# Patient Record
Sex: MALE | Race: WHITE | Employment: FULL TIME | URBAN - METROPOLITAN AREA
[De-identification: names, ages, dates, MRNs, and addresses within clinical notes are randomized per-mention and may not be internally consistent; named-entity substitution may affect disease eponyms.]

---

## 2017-09-26 ENCOUNTER — OFFICE VISIT (OUTPATIENT)
Dept: FAMILY MEDICINE CLINIC | Age: 27
End: 2017-09-26
Payer: COMMERCIAL

## 2017-09-26 VITALS
TEMPERATURE: 98.9 F | HEART RATE: 81 BPM | SYSTOLIC BLOOD PRESSURE: 124 MMHG | BODY MASS INDEX: 30.58 KG/M2 | DIASTOLIC BLOOD PRESSURE: 84 MMHG | HEIGHT: 72 IN | OXYGEN SATURATION: 97 % | WEIGHT: 225.8 LBS

## 2017-09-26 DIAGNOSIS — D17.79 LIPOMA OF OTHER SPECIFIED SITES: ICD-10-CM

## 2017-09-26 DIAGNOSIS — Z76.89 ENCOUNTER TO ESTABLISH CARE: Primary | ICD-10-CM

## 2017-09-26 DIAGNOSIS — Z11.3 ROUTINE SCREENING FOR STI (SEXUALLY TRANSMITTED INFECTION): ICD-10-CM

## 2017-09-26 DIAGNOSIS — Z11.4 SCREENING FOR HIV (HUMAN IMMUNODEFICIENCY VIRUS): ICD-10-CM

## 2017-09-26 PROCEDURE — 99203 OFFICE O/P NEW LOW 30 MIN: CPT | Performed by: NURSE PRACTITIONER

## 2017-09-26 ASSESSMENT — PATIENT HEALTH QUESTIONNAIRE - PHQ9
2. FEELING DOWN, DEPRESSED OR HOPELESS: 1
SUM OF ALL RESPONSES TO PHQ9 QUESTIONS 1 & 2: 1
1. LITTLE INTEREST OR PLEASURE IN DOING THINGS: 0
SUM OF ALL RESPONSES TO PHQ QUESTIONS 1-9: 1

## 2017-09-26 ASSESSMENT — ENCOUNTER SYMPTOMS
DIARRHEA: 0
SHORTNESS OF BREATH: 0
CONSTIPATION: 0
NAUSEA: 0
CHEST TIGHTNESS: 0
VOMITING: 0
TROUBLE SWALLOWING: 0

## 2019-08-08 ENCOUNTER — OFFICE VISIT (OUTPATIENT)
Dept: FAMILY MEDICINE CLINIC | Age: 29
End: 2019-08-08
Payer: COMMERCIAL

## 2019-08-08 VITALS
HEART RATE: 78 BPM | OXYGEN SATURATION: 97 % | DIASTOLIC BLOOD PRESSURE: 82 MMHG | HEIGHT: 72 IN | BODY MASS INDEX: 30.07 KG/M2 | SYSTOLIC BLOOD PRESSURE: 128 MMHG | RESPIRATION RATE: 16 BRPM | WEIGHT: 222 LBS | TEMPERATURE: 97.2 F

## 2019-08-08 DIAGNOSIS — D17.1 LIPOMA OF BACK: ICD-10-CM

## 2019-08-08 DIAGNOSIS — Z23 NEED FOR PROPHYLACTIC VACCINATION AGAINST DIPHTHERIA-TETANUS-PERTUSSIS (DTP): ICD-10-CM

## 2019-08-08 DIAGNOSIS — Z00.00 WELL ADULT EXAM: Primary | ICD-10-CM

## 2019-08-08 DIAGNOSIS — Z30.2 ENCOUNTER FOR VASECTOMY: ICD-10-CM

## 2019-08-08 PROCEDURE — 90471 IMMUNIZATION ADMIN: CPT | Performed by: INTERNAL MEDICINE

## 2019-08-08 PROCEDURE — 99385 PREV VISIT NEW AGE 18-39: CPT | Performed by: INTERNAL MEDICINE

## 2019-08-08 PROCEDURE — 90715 TDAP VACCINE 7 YRS/> IM: CPT | Performed by: INTERNAL MEDICINE

## 2019-08-08 ASSESSMENT — PATIENT HEALTH QUESTIONNAIRE - PHQ9
SUM OF ALL RESPONSES TO PHQ9 QUESTIONS 1 & 2: 0
SUM OF ALL RESPONSES TO PHQ QUESTIONS 1-9: 0
2. FEELING DOWN, DEPRESSED OR HOPELESS: 0
SUM OF ALL RESPONSES TO PHQ QUESTIONS 1-9: 0
1. LITTLE INTEREST OR PLEASURE IN DOING THINGS: 0

## 2019-08-08 NOTE — PATIENT INSTRUCTIONS
Patient Education        Learning About Benign Soft Tissue Tumors  What is a benign soft tissue tumor? A soft tissue tumor is a growth of abnormal cells in the body's soft tissues. These tissues include the muscles, lymph and blood vessels, nerves, and fat. They can also include cartilage and other connective tissues. When a tumor is benign (say \"Temitope\"), that means it's not cancer. Most soft tissue tumors are benign. Benign tumors don't spread to other tissues and organs. They usually aren't life-threatening. But they can cause problems if they grow too much, press on nerves, or cause pain. What are some common types of these tumors? Some common types of benign soft tissue tumors include:  Lipomas. These tumors form from fat cells. Angiolipomas are a type of lipoma made up of fat and blood vessels. Nerve sheath tumors. Tumors on a nerve may include schwannomas and neurofibromas. They might need to be removed. Benign synovial tumors. These appear around the tendons, near the knee, hip, elbow, or shoulder. Examples include giant cell tumor of the tendon sheath and synovial chondromatosis. Hemangiomas. These are tumors of the blood vessels. Desmoid tumors. These tumors commonly appear on the shoulder, chest, back, and thighs. Nodular fasciitis. These are most common in the arms. They can grow quickly. Other types of tumors may appear on the skin, belly, arms and legs, organs, and nerves. What are the symptoms? Sometimes a tumor can be felt as a bump under the skin. Or if the tumor is deep enough below the skin, you may not be able to feel it. You may also feel pain near the tumor if it's large or pressing on something. How are these tumors diagnosed? Your doctor will ask you about your symptoms and past health and will examine you. A physical exam can help your doctor diagnose some soft tissue tumors.   Your doctor may find a tumor when taking X-rays or other imaging tests for another

## 2019-08-10 ASSESSMENT — ENCOUNTER SYMPTOMS
ABDOMINAL PAIN: 0
SHORTNESS OF BREATH: 0
BACK PAIN: 0
COUGH: 0
STRIDOR: 0
NAUSEA: 0
RECTAL PAIN: 0
CONSTIPATION: 0
VOMITING: 0
DIARRHEA: 0
TROUBLE SWALLOWING: 0
WHEEZING: 0
CHEST TIGHTNESS: 0
COLOR CHANGE: 0
VOICE CHANGE: 0
BLOOD IN STOOL: 0

## 2019-10-16 PROBLEM — Z98.52 S/P VASECTOMY: Status: ACTIVE | Noted: 2019-10-16

## 2020-01-22 ENCOUNTER — TELEPHONE (OUTPATIENT)
Dept: FAMILY MEDICINE CLINIC | Age: 30
End: 2020-01-22

## 2020-02-11 ENCOUNTER — HOSPITAL ENCOUNTER (OUTPATIENT)
Age: 30
Setting detail: SPECIMEN
Discharge: HOME | End: 2020-02-11
Payer: COMMERCIAL

## 2020-02-11 LAB
ABSOLUTE EOS #: 0.36 K/UL (ref 0–0.44)
ABSOLUTE IMMATURE GRANULOCYTE: <0.03 K/UL (ref 0–0.3)
ABSOLUTE LYMPH #: 1.86 K/UL (ref 1.1–3.7)
ABSOLUTE MONO #: 0.55 K/UL (ref 0.1–1.2)
ALBUMIN SERPL-MCNC: 4.5 G/DL (ref 3.5–5.2)
ALBUMIN/GLOBULIN RATIO: 1.5 (ref 1–2.5)
ALP BLD-CCNC: 51 U/L (ref 40–129)
ALT SERPL-CCNC: 19 U/L (ref 5–41)
ANION GAP SERPL CALCULATED.3IONS-SCNC: 15 MMOL/L (ref 9–17)
AST SERPL-CCNC: 21 U/L
BASOPHILS # BLD: 1 % (ref 0–2)
BASOPHILS ABSOLUTE: 0.04 K/UL (ref 0–0.2)
BILIRUB SERPL-MCNC: 0.47 MG/DL (ref 0.3–1.2)
BUN BLDV-MCNC: 12 MG/DL (ref 6–20)
BUN/CREAT BLD: NORMAL (ref 9–20)
CALCIUM SERPL-MCNC: 9 MG/DL (ref 8.6–10.4)
CHLORIDE BLD-SCNC: 104 MMOL/L (ref 98–107)
CHOLESTEROL/HDL RATIO: 5
CHOLESTEROL: 225 MG/DL
CO2: 23 MMOL/L (ref 20–31)
CREAT SERPL-MCNC: 0.87 MG/DL (ref 0.7–1.2)
DIFFERENTIAL TYPE: ABNORMAL
EOSINOPHILS RELATIVE PERCENT: 8 % (ref 1–4)
GFR AFRICAN AMERICAN: >60 ML/MIN
GFR NON-AFRICAN AMERICAN: >60 ML/MIN
GFR SERPL CREATININE-BSD FRML MDRD: NORMAL ML/MIN/{1.73_M2}
GFR SERPL CREATININE-BSD FRML MDRD: NORMAL ML/MIN/{1.73_M2}
GLUCOSE BLD-MCNC: 84 MG/DL (ref 70–99)
HCT VFR BLD CALC: 47.7 % (ref 40.7–50.3)
HDLC SERPL-MCNC: 45 MG/DL
HEMOGLOBIN: 15.2 G/DL (ref 13–17)
IMMATURE GRANULOCYTES: 0 %
LDL CHOLESTEROL: 162 MG/DL (ref 0–130)
LYMPHOCYTES # BLD: 40 % (ref 24–43)
MCH RBC QN AUTO: 29.7 PG (ref 25.2–33.5)
MCHC RBC AUTO-ENTMCNC: 31.9 G/DL (ref 28.4–34.8)
MCV RBC AUTO: 93.2 FL (ref 82.6–102.9)
MONOCYTES # BLD: 12 % (ref 3–12)
NRBC AUTOMATED: 0 PER 100 WBC
PDW BLD-RTO: 13.7 % (ref 11.8–14.4)
PLATELET # BLD: 254 K/UL (ref 138–453)
PLATELET ESTIMATE: ABNORMAL
PMV BLD AUTO: 11 FL (ref 8.1–13.5)
POTASSIUM SERPL-SCNC: 4.9 MMOL/L (ref 3.7–5.3)
RBC # BLD: 5.12 M/UL (ref 4.21–5.77)
RBC # BLD: ABNORMAL 10*6/UL
SEG NEUTROPHILS: 39 % (ref 36–65)
SEGMENTED NEUTROPHILS ABSOLUTE COUNT: 1.78 K/UL (ref 1.5–8.1)
SODIUM BLD-SCNC: 142 MMOL/L (ref 135–144)
TOTAL PROTEIN: 7.5 G/DL (ref 6.4–8.3)
TRIGL SERPL-MCNC: 91 MG/DL
VLDLC SERPL CALC-MCNC: ABNORMAL MG/DL (ref 1–30)
WBC # BLD: 4.6 K/UL (ref 3.5–11.3)
WBC # BLD: ABNORMAL 10*3/UL

## 2020-02-17 ENCOUNTER — OFFICE VISIT (OUTPATIENT)
Dept: FAMILY MEDICINE CLINIC | Age: 30
End: 2020-02-17
Payer: COMMERCIAL

## 2020-02-17 VITALS
DIASTOLIC BLOOD PRESSURE: 72 MMHG | HEART RATE: 64 BPM | RESPIRATION RATE: 16 BRPM | TEMPERATURE: 98.2 F | WEIGHT: 226 LBS | HEIGHT: 72 IN | SYSTOLIC BLOOD PRESSURE: 130 MMHG | BODY MASS INDEX: 30.61 KG/M2

## 2020-02-17 PROCEDURE — 99214 OFFICE O/P EST MOD 30 MIN: CPT | Performed by: INTERNAL MEDICINE

## 2020-02-17 RX ORDER — CLINDAMYCIN HYDROCHLORIDE 300 MG/1
300 CAPSULE ORAL 3 TIMES DAILY
Qty: 21 CAPSULE | Refills: 0 | Status: SHIPPED | OUTPATIENT
Start: 2020-02-17 | End: 2020-02-24

## 2020-02-17 ASSESSMENT — ENCOUNTER SYMPTOMS
ABDOMINAL PAIN: 0
DIARRHEA: 0
COUGH: 0
SHORTNESS OF BREATH: 0
BACK PAIN: 0
VOICE CHANGE: 0
RHINORRHEA: 0
CHEST TIGHTNESS: 0
CHOKING: 0
STRIDOR: 0
WHEEZING: 0
TROUBLE SWALLOWING: 0
SINUS PRESSURE: 1
CONSTIPATION: 0
SINUS PAIN: 0
NAUSEA: 0

## 2020-02-17 ASSESSMENT — PATIENT HEALTH QUESTIONNAIRE - PHQ9
SUM OF ALL RESPONSES TO PHQ QUESTIONS 1-9: 0
1. LITTLE INTEREST OR PLEASURE IN DOING THINGS: 0
SUM OF ALL RESPONSES TO PHQ9 QUESTIONS 1 & 2: 0
2. FEELING DOWN, DEPRESSED OR HOPELESS: 0
SUM OF ALL RESPONSES TO PHQ QUESTIONS 1-9: 0

## 2020-02-18 NOTE — PROGRESS NOTES
[facility]  [address]  [address] [address]  Dept: [phone]  Dept Fax: [phone]    Tony Crabtree a 26 y.o. male who presents today for his medical conditions/complaints as notedbelow. Wendi Johnson is c/o of   Chief Complaint   Patient presents with   [address]     pt is here to discuss labs     Orders     pt needs a referral for sleep study     Dental Pain     pt is here for dental pain on the left pain          HPI:     Here for multiple complaints   Also to go over labs   Chol mildly elevated , does have family hx   Goes to the gym about 4-5 times a week , quit smoking about one year ago , no hx of mi /cva   No htn     No cardiac sxs   Cbc/cmp overall normal , eoisonphils slightly elevated , no GERD       Also needs note documenting hesham symptoms   The regular snoring   Very tired duing the day   Occasional AM headaches, quite severe  Wife notices his gasping for air , choking in his sleep   Needs sleep study re-ordered       Dental Pain    This is a new problem. The current episode started 1 to 4 weeks ago. The problem occurs every few minutes. The problem has been gradually worsening. The pain is at a severity of 6/10. Associated symptoms include facial pain, sinus pressure and thermal sensitivity. Pertinent negatives include no difficulty swallowing, fever or oral bleeding. He has tried NSAIDs and ice for the symptoms. The treatment provided mild relief. Shoulder Pain    The pain is present in the right shoulder. This is a new problem. The current episode started more than 1 month ago. There has been a history of trauma. The problem occurs every several days. The problem has been gradually improving. The quality of the pain is described as aching. The pain is at a severity of 2/10. The pain is mild. Associated symptoms include joint locking and stiffness.  Pertinent negatives include no fever, inability to bear weight, joint swelling, limited range of motion or numbness. The symptoms are aggravated by activity, contact and lying down. He has tried acetaminophen, NSAIDS, rest, movement, cold, heat and OTC pain meds for the symptoms. The treatment provided moderate relief. No results found for: LABA1C      ( goal A1C is < 7)   No results found for: LABMICR  LDL Cholesterol (mg/dL)   Date Value   2020 162 (H)       (goal LDL is <100)   AST (U/L)   Date Value   2020 21     ALT (U/L)   Date Value   2020 19     BUN (mg/dL)   Date Value   2020 12     BP Readings from Last 3 Encounters:   20 130/72   19 120/86   19 128/82          (goal 120/80)    Past Medical History:   Diagnosis Date    Caffeine use     1 coffee & 1 cola / day    Sleep apnea       Past Surgical History:   Procedure Laterality Date    OTHER SURGICAL HISTORY      Tubes in ears     TYMPANOSTOMY TUBE PLACEMENT Bilateral        Family History   Problem Relation Age of Onset    High Blood Pressure Mother     No Known Problems Father        Social History     Tobacco Use    Smoking status: Former Smoker     Packs/day: 1.00     Types: Cigarettes     Start date: 2008     Last attempt to quit: 2019     Years since quittin.1    Smokeless tobacco: Former User   Substance Use Topics    Alcohol use: Yes     Alcohol/week: 4.0 standard drinks     Types: 4 Cans of beer per week      Current Outpatient Medications   Medication Sig Dispense Refill    clindamycin (CLEOCIN) 300 MG capsule Take 1 capsule by mouth 3 times daily for 7 days 21 capsule 0    ibuprofen (ADVIL;MOTRIN) 800 MG tablet Take 1 tablet by mouth every 8 hours as needed for Pain Take 1 tab 1/2 prior to procedure and then every 8 hours after PRN 22 tablet 0     No current facility-administered medications for this visit.       Allergies   Allergen Reactions    Penicillins Rash          Health Maintenance   Topic Date Due    HIV screen 08/22/2004    DTaP/Tdap/Td vaccine (2 - Td) 08/08/2029    Shingles Vaccine (1 of 2) 08/22/2039    Flu vaccine  Completed    Hepatitis A vaccine  Aged Out    Hepatitis B vaccine  Aged Out    Hib vaccine  Aged Out    Meningococcal (ACWY) vaccine  Aged Out    Pneumococcal 0-64 years Vaccine  Aged Out    Varicella vaccine  Discontinued       Subjective:     Review of Systems   Constitutional: Positive for activity change. Negative for fatigue, fever and unexpected weight change. HENT: Positive for dental problem and sinus pressure. Negative for hearing loss, mouth sores, nosebleeds, postnasal drip, rhinorrhea, sinus pain, sneezing, tinnitus, trouble swallowing and voice change. Eyes: Negative for visual disturbance. Respiratory: Negative for cough, choking, chest tightness, shortness of breath, wheezing and stridor. Cardiovascular: Negative for chest pain, palpitations and leg swelling. Gastrointestinal: Negative for abdominal pain, constipation, diarrhea and nausea. Genitourinary: Negative for difficulty urinating. Musculoskeletal: Positive for arthralgias and stiffness. Negative for back pain, gait problem, joint swelling, myalgias, neck pain and neck stiffness. Skin: Negative for rash. Allergic/Immunologic: Negative for environmental allergies and food allergies. Neurological: Negative for dizziness, tremors, syncope, weakness, light-headedness, numbness and headaches. Hematological: Negative for adenopathy. Does not bruise/bleed easily. Psychiatric/Behavioral: Negative for sleep disturbance. Objective:      Physical Exam  Vitals signs and nursing note reviewed. Constitutional:       General: He is not in acute distress. Appearance: He is well-developed. He is not ill-appearing, toxic-appearing or diaphoretic. HENT:      Nose: Nose normal.      Mouth/Throat:      Mouth: Mucous membranes are moist.      Dentition: Abnormal dentition. Does not have dentures.  Dental caries present. No dental tenderness, gingival swelling or dental abscesses. Tongue: No lesions. Pharynx: Oropharynx is clear. Uvula midline. No pharyngeal swelling, oropharyngeal exudate, posterior oropharyngeal erythema or uvula swelling. Eyes:      Pupils: Pupils are equal, round, and reactive to light. Neck:      Musculoskeletal: Normal range of motion. Normal range of motion. No spinous process tenderness or muscular tenderness. Thyroid: No thyroid mass or thyroid tenderness. Cardiovascular:      Rate and Rhythm: Normal rate and regular rhythm. Heart sounds: Normal heart sounds. Pulmonary:      Effort: Pulmonary effort is normal.      Breath sounds: Normal breath sounds. Abdominal:      General: Bowel sounds are normal.      Palpations: Abdomen is soft. There is no hepatomegaly or splenomegaly. Tenderness: There is no abdominal tenderness. Musculoskeletal:      Right shoulder: He exhibits tenderness, crepitus, pain and spasm. He exhibits normal range of motion, no bony tenderness, no swelling, no effusion, no deformity, no laceration, normal pulse and normal strength. Skin:     General: Skin is warm and dry. Findings: No rash. Neurological:      Mental Status: He is alert and oriented to person, place, and time. Cranial Nerves: Cranial nerves are intact. No cranial nerve deficit. Sensory: Sensation is intact. Deep Tendon Reflexes: Reflexes are normal and symmetric. /72 (Site: Left Upper Arm, Position: Sitting, Cuff Size: Medium Adult)   Pulse 64   Temp 98.2 °F (36.8 °C) (Tympanic)   Resp 16   Ht 6' (1.829 m)   Wt 226 lb (102.5 kg)   BMI 30.65 kg/m²     Assessment:       Diagnosis Orders   1. Acute pain of right shoulder     2. Snoring  Sleep Study with PAP Titration    Baseline Diagnostic Sleep Study   3. Daytime somnolence  Sleep Study with PAP Titration    Baseline Diagnostic Sleep Study   4.  Other fatigue  Sleep Study with PAP

## 2020-03-13 ENCOUNTER — HOSPITAL ENCOUNTER (OUTPATIENT)
Dept: SLEEP CENTER | Age: 30
Discharge: HOME | End: 2020-03-15
Payer: COMMERCIAL

## 2020-03-13 VITALS
TEMPERATURE: 98.6 F | BODY MASS INDEX: 30.61 KG/M2 | RESPIRATION RATE: 21 BRPM | HEART RATE: 81 BPM | OXYGEN SATURATION: 95 % | HEIGHT: 72 IN | WEIGHT: 226 LBS

## 2020-03-13 PROCEDURE — 95810 POLYSOM 6/> YRS 4/> PARAM: CPT

## 2020-03-26 LAB — STATUS: NORMAL

## 2020-09-16 ENCOUNTER — OFFICE VISIT (OUTPATIENT)
Dept: FAMILY MEDICINE CLINIC | Age: 30
End: 2020-09-16
Payer: COMMERCIAL

## 2020-09-16 ENCOUNTER — HOSPITAL ENCOUNTER (OUTPATIENT)
Age: 30
Setting detail: SPECIMEN
Discharge: HOME | End: 2020-09-16
Payer: COMMERCIAL

## 2020-09-16 VITALS — HEART RATE: 83 BPM | OXYGEN SATURATION: 98 % | TEMPERATURE: 97.3 F

## 2020-09-16 PROCEDURE — 99202 OFFICE O/P NEW SF 15 MIN: CPT | Performed by: NURSE PRACTITIONER

## 2020-09-16 RX ORDER — DOXYCYCLINE HYCLATE 100 MG
100 TABLET ORAL 2 TIMES DAILY
Qty: 14 TABLET | Refills: 0 | Status: SHIPPED | OUTPATIENT
Start: 2020-09-16 | End: 2020-09-23

## 2020-09-16 RX ORDER — FLUTICASONE PROPIONATE 50 MCG
2 SPRAY, SUSPENSION (ML) NASAL DAILY
Qty: 1 BOTTLE | Refills: 0 | Status: SHIPPED | OUTPATIENT
Start: 2020-09-16

## 2020-09-16 ASSESSMENT — ENCOUNTER SYMPTOMS
BACK PAIN: 0
NAUSEA: 0
SORE THROAT: 0
VOMITING: 0
SINUS PAIN: 1
SINUS PRESSURE: 1
RHINORRHEA: 1
ABDOMINAL PAIN: 0
DIARRHEA: 0
EYE PAIN: 0
SHORTNESS OF BREATH: 0
COUGH: 0

## 2020-09-16 NOTE — PROGRESS NOTES
[facility]  [address] [place] [address]  Dept: [phone]  Dept Fax: [phone]    Gerardo Mcdonough is a 31 y.o. male who presents to the urgent care today for his medicalconditions/complaints as noted below. Gerardo Mcdonough is c/o of Nasal Congestion      HPI:     43-year-old female patient Antoine Mendoza with complaint of nasal congestion, frontal sinus pressure. Reports symptoms been ongoing for 1 to 2 weeks. Reports nasal congestion, rhinorrhea that is yellow. Reports intermittent symptoms which gradually worsen. Reports sinus pressure, fullness. Denies fevers or chills. Denies cough. Denies chest pain or shortness breath. Denies vomiting diarrhea. Denies loss of taste smell. Denies any known exposure. Has tried Zyrtec. Past Medical History:   Diagnosis Date    Caffeine use     1 coffee & 1 cola / day    Sleep apnea         Current Outpatient Medications   Medication Sig Dispense Refill    doxycycline hyclate (VIBRA-TABS) 100 MG tablet Take 1 tablet by mouth 2 times daily for 7 days 14 tablet 0    fluticasone (FLONASE) 50 MCG/ACT nasal spray 2 sprays by Nasal route daily 1 Bottle 0    ibuprofen (ADVIL;MOTRIN) 800 MG tablet Take 1 tablet by mouth every 8 hours as needed for Pain Take 1 tab 1/2 prior to procedure and then every 8 hours after PRN (Patient not taking: Reported on 9/16/2020) 22 tablet 0     No current facility-administered medications for this visit. Allergies   Allergen Reactions    Penicillins Rash       Subjective:      Review of Systems   Constitutional: Negative for chills, fatigue and fever. HENT: Positive for congestion, postnasal drip, rhinorrhea, sinus pressure and sinus pain. Negative for ear pain and sore throat. Eyes: Negative for pain and visual disturbance. Respiratory: Negative for cough and shortness of breath. Cardiovascular: Negative for chest pain and palpitations.

## 2020-09-16 NOTE — PATIENT INSTRUCTIONS
Patient Education        Sinusitis: Care Instructions  Your Care Instructions        Sinusitis is an infection of the lining of the sinus cavities in your head. Sinusitis often follows a cold. It causes pain and pressure in your head and face. In most cases, sinusitis gets better on its own in 1 to 2 weeks. But some mild symptoms may last for several weeks. Sometimes antibiotics are needed. Follow-up care is a key part of your treatment and safety. Be sure to make and go to all appointments, and call your doctor if you are having problems. It's also a good idea to know your test results and keep a list of the medicines you take. How can you care for yourself at home? · Take an over-the-counter pain medicine, such as acetaminophen (Tylenol), ibuprofen (Advil, Motrin), or naproxen (Aleve). Read and follow all instructions on the label. · If the doctor prescribed antibiotics, take them as directed. Do not stop taking them just because you feel better. You need to take the full course of antibiotics. · Be careful when taking over-the-counter cold or flu medicines and Tylenol at the same time. Many of these medicines have acetaminophen, which is Tylenol. Read the labels to make sure that you are not taking more than the recommended dose. Too much acetaminophen (Tylenol) can be harmful. · Breathe warm, moist air from a steamy shower, a hot bath, or a sink filled with hot water. Avoid cold, dry air. Using a humidifier in your home may help. Follow the directions for cleaning the machine. · Use saline (saltwater) nasal washes to help keep your nasal passages open and wash out mucus and bacteria. You can buy saline nose drops at a grocery store or drugstore. Or you can make your own at home by adding 1 teaspoon of salt and 1 teaspoon of baking soda to 2 cups of distilled water. If you make your own, fill a bulb syringe with the solution, insert the tip into your nostril, and squeeze gently. Saira Led your nose.   · Put a hot, wet towel or a warm gel pack on your face 3 or 4 times a day for 5 to 10 minutes each time. · Try a decongestant nasal spray like oxymetazoline (Afrin). Do not use it for more than 3 days in a row. Using it for more than 3 days can make your congestion worse. When should you call for help? Call your doctor now or seek immediate medical care if:  · You have new or worse swelling or redness in your face or around your eyes. · You have a new or higher fever. Watch closely for changes in your health, and be sure to contact your doctor if:  · You have new or worse facial pain. · The mucus from your nose becomes thicker (like pus) or has new blood in it. · You are not getting better as expected. Where can you learn more? Go to [email or web address] and sign in to your [email or web address] account. Enter X040 in the THYME box to learn more about \"Sinusitis: Care Instructions. \"     If you do not have an account, please click on the \"Sign Up Now\" link. Current as of: July 29, 2019               Content Version: 12.5  © 2319-0848 [employer]wise, Incorporated. Care instructions adapted under license by [facility] ([facility]). If you have questions about a medical condition or this instruction, always ask your healthcare professional. [address] any warranty or liability for your use of this information.

## 2020-09-19 LAB — SARS-COV-2, NAA: NOT DETECTED

## 2021-01-28 ENCOUNTER — OFFICE VISIT (OUTPATIENT)
Dept: FAMILY MEDICINE CLINIC | Age: 31
End: 2021-01-28
Payer: COMMERCIAL

## 2021-01-28 VITALS
RESPIRATION RATE: 18 BRPM | BODY MASS INDEX: 32.23 KG/M2 | OXYGEN SATURATION: 97 % | SYSTOLIC BLOOD PRESSURE: 134 MMHG | TEMPERATURE: 97.5 F | DIASTOLIC BLOOD PRESSURE: 84 MMHG | HEART RATE: 69 BPM | WEIGHT: 238 LBS | HEIGHT: 72 IN

## 2021-01-28 DIAGNOSIS — D17.79 LIPOMA OF OTHER SPECIFIED SITES: ICD-10-CM

## 2021-01-28 DIAGNOSIS — G56.03 BILATERAL CARPAL TUNNEL SYNDROME: Primary | ICD-10-CM

## 2021-01-28 DIAGNOSIS — G62.9 NEUROPATHY: ICD-10-CM

## 2021-01-28 DIAGNOSIS — M79.641 RIGHT HAND PAIN: ICD-10-CM

## 2021-01-28 PROCEDURE — 99213 OFFICE O/P EST LOW 20 MIN: CPT | Performed by: INTERNAL MEDICINE

## 2021-01-28 ASSESSMENT — PATIENT HEALTH QUESTIONNAIRE - PHQ9
1. LITTLE INTEREST OR PLEASURE IN DOING THINGS: 0
SUM OF ALL RESPONSES TO PHQ9 QUESTIONS 1 & 2: 0
SUM OF ALL RESPONSES TO PHQ QUESTIONS 1-9: 0

## 2021-01-28 ASSESSMENT — ENCOUNTER SYMPTOMS
DIARRHEA: 0
CONSTIPATION: 0
BACK PAIN: 0
ABDOMINAL PAIN: 0

## 2021-01-28 NOTE — PROGRESS NOTES
[facility]  [address] [address]  Dept: [phone]  Dept Fax: [phone]    Aliyah Marin a 31 y.o. male who presents today for his medical conditions/complaints as notedbelow. Arvind Menendez is c/o of   Chief Complaint   Patient presents with    Hand Pain     right hand    Numbness     mostly right hand    Other     bump on back- possible fatty deposit- getting bigger       HPI:     Pain to left and right hand right a lot worse than left   ongoing for a few years but has gotten a lot worse  More so numbness and tingling   Is waking him up from sleep   Also now getting weakness and not gripping as well   Has not dropped anything   Uses his hands for work all the time   Does repitive motion   sxs mainly to the hands wrist   occasionally form elbow  Not from neck or shoulder   Has never been seen for this before  Has not tried anything for this   Feels it when holding a phone or ipd also         Also the spot on his back that was checked the last time he was here , was told it was fatty tumor or deposit   Has got a lot bigger he feels  It is also affecting his sleep   He lies on his back andit will wake him up and cause a lot of discomfort   Cannot get in a good condition   Will irritate him when putting on clothes and will catch       Hand Pain   The incident occurred more than 1 week ago. The incident occurred at home. There was no injury mechanism. The pain is present in the left hand, right hand, left wrist and right wrist. The quality of the pain is described as aching and shooting. The pain radiates to the right arm. The pain is at a severity of 6/10. The pain is moderate. The pain has been fluctuating since the incident. Associated symptoms include muscle weakness, numbness and tingling. Pertinent negatives include no chest pain. The symptoms are aggravated by movement, lifting and palpation.  He has tried rest and elevation for the symptoms. The treatment provided mild relief. No results found for: LABA1C      ( goal A1C is < 7)   No results found for: LABMICR  LDL Cholesterol (mg/dL)   Date Value   2020 162 (H)       (goal LDL is <100)   AST (U/L)   Date Value   2020 21     ALT (U/L)   Date Value   2020 19     BUN (mg/dL)   Date Value   2020 12     BP Readings from Last 3 Encounters:   21 134/84   20 130/72   19 120/86          (goal 120/80)    Past Medical History:   Diagnosis Date    Caffeine use     1 coffee & 1 cola / day    Sleep apnea       Past Surgical History:   Procedure Laterality Date    OTHER SURGICAL HISTORY      Tubes in ears     TYMPANOSTOMY TUBE PLACEMENT Bilateral        Family History   Problem Relation Age of Onset    High Blood Pressure Mother     No Known Problems Father        Social History     Tobacco Use    Smoking status: Former Smoker     Packs/day: 1.00     Types: Cigarettes     Start date: 2008     Quit date: 2019     Years since quittin.0    Smokeless tobacco: Former User   Substance Use Topics    Alcohol use: Yes     Alcohol/week: 4.0 standard drinks     Types: 4 Cans of beer per week      Current Outpatient Medications   Medication Sig Dispense Refill    Thumb Spica MISC 2 each by Does not apply route continuous Use nightly for carpal tunnel symptoms and numbness 2 each 0    fluticasone (FLONASE) 50 MCG/ACT nasal spray 2 sprays by Nasal route daily (Patient not taking: Reported on 2021) 1 Bottle 0    ibuprofen (ADVIL;MOTRIN) 800 MG tablet Take 1 tablet by mouth every 8 hours as needed for Pain Take 1 tab 1/2 prior to procedure and then every 8 hours after PRN (Patient not taking: Reported on 2020) 22 tablet 0     No current facility-administered medications for this visit.       Allergies   Allergen Reactions    Penicillins Rash          Health Maintenance   Topic Date Due    Hepatitis C screen 1989    HIV screen  08/22/2004    Flu vaccine (1) 09/01/2020    DTaP/Tdap/Td vaccine (2 - Td) 08/08/2029    Hepatitis A vaccine  Aged Out    Hepatitis B vaccine  Aged Out    Hib vaccine  Aged Out    Meningococcal (ACWY) vaccine  Aged Out    Pneumococcal 0-64 years Vaccine  Aged Out    Varicella vaccine  Discontinued       Subjective:     Review of Systems   Constitutional: Positive for activity change. Negative for appetite change, chills, diaphoresis, fatigue, fever and unexpected weight change. Eyes: Negative for visual disturbance. Cardiovascular: Negative for chest pain. Gastrointestinal: Negative for abdominal pain, constipation and diarrhea. Musculoskeletal: Positive for arthralgias and myalgias. Negative for back pain, gait problem, joint swelling, neck pain and neck stiffness. Skin: Positive for wound. Negative for rash. Neurological: Positive for tingling, weakness and numbness. Negative for headaches. Hematological: Negative for adenopathy. Does not bruise/bleed easily. Psychiatric/Behavioral: Positive for sleep disturbance. Negative for suicidal ideas. Objective:      Physical Exam  Vitals signs and nursing note reviewed. Constitutional:       General: He is not in acute distress. Appearance: Normal appearance. He is not ill-appearing, toxic-appearing or diaphoretic. Neck:      Musculoskeletal: Normal range of motion and neck supple. No pain with movement, spinous process tenderness or muscular tenderness. Musculoskeletal:      Right elbow: Normal.     Right wrist: He exhibits tenderness. He exhibits normal range of motion, no bony tenderness, no swelling, no effusion, no crepitus and no deformity. Left wrist: He exhibits tenderness. He exhibits normal range of motion. Right hand: He exhibits tenderness and bony tenderness. He exhibits normal range of motion, normal capillary refill, no deformity and no laceration. Decreased sensation noted. Decreased strength noted. Left hand: He exhibits tenderness. He exhibits normal range of motion and no bony tenderness. Decreased sensation noted. Normal strength noted. Comments: + tinels test    Skin:     General: Skin is warm and dry. Neurological:      Mental Status: He is alert and oriented to person, place, and time. Cranial Nerves: Cranial nerves are intact. Sensory: Sensory deficit present. Motor: Motor function is intact. Gait: Gait is intact. Gait normal.      Deep Tendon Reflexes: Reflexes are normal and symmetric. /84   Pulse 69   Temp 97.5 °F (36.4 °C) (Temporal)   Resp 18   Ht 6' (1.829 m)   Wt 238 lb (108 kg)   SpO2 97%   BMI 32.28 kg/m²     Assessment:       Diagnosis Orders   1. Bilateral carpal tunnel syndrome  Thumb Spica MISC    Francis Bermudez MD, Orthopedic Surgery, [facility]   2. Neuropathy  Thumb Spica MISC    Carmen Pappas MD, Orthopedic Surgery, [facility]   3. Right hand pain  Thumb Spica MISC    Francis Bermudez MD, Orthopedic Surgery, [facility]   4. Lipoma of other specified sites  KAYLA - Sally Donnelly MD, General Surgery, [facility]:       No follow-ups on file.     Orders Placed This Encounter   Procedures   Francis Bermudez MD, Orthopedic Surgery, [facility]     Referral Priority:   Routine     Referral Type:   Eval and Treat     Referral Reason:   Specialty Services Required     Referred to Provider:   Suly Shukla MD     Requested Specialty:   Orthopedic Surgery     Number of Visits Requested:   Gabriel Hopkins MD, General Surgery, [facility]     Referral Priority:   Routine     Referral Type:   Eval and Treat     Referral Reason:   Specialty Services Required     Referred to Provider:   Elo Schuster MD     Requested Specialty:   General Surgery     Number of Visits Requested:   1     Orders Placed This Encounter   Medications    Thumb Spica MISC     Si each by Does not apply route continuous Use nightly for carpal tunnel symptoms and numbness     Dispense:  2 each     Refill:  0    referral to ortho for carpal tunnel   Start using wrist splint nightly   Can try b6 as well   Prednisone as needed can call if worsens    Referral to gen sx also for lipoma that has greatly enlarged since last visit and is becoming painful and affecting ADLs  Call with q/c      Patientgiven educational materials - see patient instructions. Discussed use, benefit,and side effects of prescribed medications. All patient questions answered. Ptvoiced understanding. Reviewed health maintenance. Instructed to continue currentmedications, diet and exercise. Patient agreed with treatment plan. Follow up asdirected.      Electronically signed by Toney Kayser, DO on 1/28/2021 at 11:50 AM

## 2021-01-28 NOTE — PROGRESS NOTES
Visit Information    Have you changed or started any medications since your last visit including any over-the-counter medicines, vitamins, or herbal medicines? no   Are you having any side effects from any of your medications? -  no  Have you stopped taking any of your medications? Is so, why? -  no    Have you seen any other physician or provider since your last visit? No  Have you had any other diagnostic tests since your last visit? No  Have you been seen in the emergency room and/or had an admission to a hospital since we last saw you? No  Have you had your routine dental cleaning in the past 6 months? no    Have you activated your [employer] account? If not, what are your barriers?  Yes     Patient Care Team:  Yash Herndon DO as PCP - General (Family Medicine)  True DO Yanick [facility]  Murphy Carson MD as Consulting Physician (Urology)    Medical History Review  Past Medical, Family, and Social History reviewed and does contribute to the patient presenting condition    Health Maintenance   Topic Date Due    Hepatitis C screen  1990    HIV screen  08/22/2004    Flu vaccine (1) 09/01/2020    DTaP/Tdap/Td vaccine (2 - Td) 08/08/2029    Hepatitis A vaccine  Aged Out    Hepatitis B vaccine  Aged Out    Hib vaccine  Aged Out    Meningococcal (ACWY) vaccine  Aged Out    Pneumococcal 0-64 years Vaccine  Aged Out    Varicella vaccine  Discontinued

## 2021-02-08 ENCOUNTER — OFFICE VISIT (OUTPATIENT)
Dept: ORTHOPEDIC SURGERY | Age: 31
End: 2021-02-08
Payer: COMMERCIAL

## 2021-02-08 VITALS — WEIGHT: 246 LBS | BODY MASS INDEX: 33.32 KG/M2 | HEIGHT: 72 IN

## 2021-02-08 DIAGNOSIS — G56.01 RIGHT CARPAL TUNNEL SYNDROME: ICD-10-CM

## 2021-02-08 DIAGNOSIS — G56.03 BILATERAL CARPAL TUNNEL SYNDROME: Primary | ICD-10-CM

## 2021-02-08 PROCEDURE — 99202 OFFICE O/P NEW SF 15 MIN: CPT | Performed by: ORTHOPAEDIC SURGERY

## 2021-02-08 NOTE — PROGRESS NOTES
Chief Complaint   Patient presents with    Follow-up     Rt hand pain, Bir. CTS     This 67-year-old patient is seen here complaining of pain and numbness and tingling in both the hands but the right is worse than the left. The symptoms have been going on for about 3 years. He is an  and the job does aggravate the symptoms. At night he has been wearing a brace and so it doesn't bother him much but when he is riding a motorbike to about 10 min the right hand goes numb. He also does not always feel that he has grabbed an item tightly enough and that he overcompensate holding onto the right arm. He has not actually dropped anything from the hand. The pain affects the wrist joint and has numbness and tingling the mainly the radial 3-1/2 digits. Occasionally it will radiate proximally along the radial border. He has occasional pain in the neck but never been involved in an accident. He is not diabetic. Examination: Cervical spine examination shows excellent range of motion in all the directions he did have slight pain on lateral flexion to the left and right. But this did not produce any pain in the hand. His Phalen test was positive at about 50 s. There is no hypoesthesia or wasting of the muscles. Diagnosis: Bilateral carpal tunnel syndrome with the right being the most symptomatic. Treatment: Discussed with him injection versus surgery and he said that his his father had injections which did not work out and he had to undergo surgery and he would prefer to take the same route. The patient will be scheduled to have this done under local anesthesia.

## 2021-02-22 ENCOUNTER — HOSPITAL ENCOUNTER (OUTPATIENT)
Dept: LAB | Age: 31
Setting detail: SPECIMEN
Discharge: HOME | End: 2021-02-22
Payer: COMMERCIAL

## 2021-02-22 DIAGNOSIS — Z01.818 PREOP TESTING: Primary | ICD-10-CM

## 2021-02-22 PROCEDURE — U0005 INFEC AGEN DETEC AMPLI PROBE: HCPCS

## 2021-02-22 PROCEDURE — U0003 INFECTIOUS AGENT DETECTION BY NUCLEIC ACID (DNA OR RNA); SEVERE ACUTE RESPIRATORY SYNDROME CORONAVIRUS 2 (SARS-COV-2) (CORONAVIRUS DISEASE [COVID-19]), AMPLIFIED PROBE TECHNIQUE, MAKING USE OF HIGH THROUGHPUT TECHNOLOGIES AS DESCRIBED BY CMS-2020-01-R: HCPCS

## 2021-02-23 LAB
SARS-COV-2: NORMAL
SARS-COV-2: NOT DETECTED
SOURCE: NORMAL

## 2021-02-24 ENCOUNTER — TELEPHONE (OUTPATIENT)
Dept: PRIMARY CARE CLINIC | Age: 31
End: 2021-02-24

## 2021-02-26 ENCOUNTER — HOSPITAL ENCOUNTER (OUTPATIENT)
Age: 31
Setting detail: OUTPATIENT SURGERY
Discharge: HOME | End: 2021-02-26
Attending: ORTHOPAEDIC SURGERY | Admitting: ORTHOPAEDIC SURGERY
Payer: COMMERCIAL

## 2021-02-26 VITALS
HEART RATE: 61 BPM | TEMPERATURE: 97.9 F | OXYGEN SATURATION: 96 % | HEIGHT: 72 IN | SYSTOLIC BLOOD PRESSURE: 132 MMHG | RESPIRATION RATE: 18 BRPM | DIASTOLIC BLOOD PRESSURE: 87 MMHG | WEIGHT: 230 LBS | BODY MASS INDEX: 31.15 KG/M2

## 2021-02-26 DIAGNOSIS — Z98.890 HISTORY OF CARPAL TUNNEL RELEASE: Primary | ICD-10-CM

## 2021-02-26 PROCEDURE — 7100000040 HC SPAR PHASE II RECOVERY - FIRST 15 MIN: Performed by: ORTHOPAEDIC SURGERY

## 2021-02-26 PROCEDURE — 7100000041 HC SPAR PHASE II RECOVERY - ADDTL 15 MIN: Performed by: ORTHOPAEDIC SURGERY

## 2021-02-26 PROCEDURE — 2500000003 HC RX 250 WO HCPCS: Performed by: ORTHOPAEDIC SURGERY

## 2021-02-26 PROCEDURE — 2580000003 HC RX 258: Performed by: ORTHOPAEDIC SURGERY

## 2021-02-26 PROCEDURE — 3600000003 HC SURGERY LEVEL 3 BASE: Performed by: ORTHOPAEDIC SURGERY

## 2021-02-26 PROCEDURE — 2709999900 HC NON-CHARGEABLE SUPPLY: Performed by: ORTHOPAEDIC SURGERY

## 2021-02-26 PROCEDURE — 3600000013 HC SURGERY LEVEL 3 ADDTL 15MIN: Performed by: ORTHOPAEDIC SURGERY

## 2021-02-26 PROCEDURE — 64721 CARPAL TUNNEL SURGERY: CPT | Performed by: ORTHOPAEDIC SURGERY

## 2021-02-26 RX ORDER — MAGNESIUM HYDROXIDE 1200 MG/15ML
LIQUID ORAL CONTINUOUS PRN
Status: COMPLETED | OUTPATIENT
Start: 2021-02-26 | End: 2021-02-26

## 2021-02-26 RX ORDER — HYDROCODONE BITARTRATE AND ACETAMINOPHEN 5; 325 MG/1; MG/1
1 TABLET ORAL EVERY 6 HOURS PRN
Qty: 20 TABLET | Refills: 0 | Status: SHIPPED | OUTPATIENT
Start: 2021-02-26 | End: 2021-03-03

## 2021-02-26 ASSESSMENT — PAIN - FUNCTIONAL ASSESSMENT: PAIN_FUNCTIONAL_ASSESSMENT: 0-10

## 2021-02-26 NOTE — PROGRESS NOTES
Patient discharged with all belongings, ambulatory with family to private residence. Patient had discharge paperwork, prescriptions and all questions asked were answered.

## 2021-02-26 NOTE — BRIEF OP NOTE
Brief Postoperative Note      Patient: Racquel Albarran  YOB: 1990  MRN: [id number]    Date of Procedure: 2/26/2021    Pre-Op Diagnosis: RIGHT CARPAL TUNNEL SYNDROME    Post-Op Diagnosis: RIGHT CARPAL TUNNEL SYNDROME       Procedure(s):  CARPAL TUNNEL RELEASE (SUPINE) 3080 TABLE    Surgeon(s):  Analia Kaye MD    Assistant:  Resident: Gumaro Ryan DO; John Sterling DO    Anesthesia: Local; 9cc half 0.5% bupivicaine/1% lidocaine without epi    Estimated Blood Loss (mL): Minimal    Fluids: None    Complications: None    Specimens:   * No specimens in log *    Implants:  * No implants in log *      Drains: * No LDAs found *    Findings: hypertrophied transverse carpal ligament. See op note.     Electronically signed by John Sterling DO on 2/26/2021 at 8:18 AM

## 2021-02-26 NOTE — H&P
Start date: 2008     Quit date: 2019     Years since quittin.1    Smokeless tobacco: Former User   Substance and Sexual Activity    Alcohol use: Yes     Alcohol/week: 4.0 standard drinks     Types: 4 Cans of beer per week    Drug use: No    Sexual activity: Yes     Partners: Female   Lifestyle    Physical activity     Days per week: None     Minutes per session: None    Stress: None   Relationships    Social connections     Talks on phone: None     Gets together: None     Attends [employer] service: None     Active member of club or organization: None     Attends meetings of clubs or organizations: None     Relationship status: None    Intimate partner violence     Fear of current or ex partner: None     Emotionally abused: None     Physically abused: None     Forced sexual activity: None   Other Topics Concern    None   Social History Narrative    None     Family History:  Family History   Problem Relation Age of Onset    High Blood Pressure Mother     No Known Problems Father        REVIEW OF SYSTEMS:  No changes from baseline regarding Fever, Chills, Chest pain, SOB, Respiratory, Cardiovascular, GI, , Dermatologic, Endocrine. PHYSICAL EXAM:  Blood pressure (!) 150/109, pulse 69, temperature 97.7 °F (36.5 °C), temperature source Temporal, resp. rate 18, height 6' (1.829 m), weight 230 lb (104.3 kg), SpO2 99 %. Gen: alert and oriented  Resp: symmetric chest excursion, non labored breathing  Heart: RRR     RUE: Skin intact, dysesthesias to the median nerve distribution. Compartments soft. Med/Rad/Ulnar/AIN/PIN motor intact. Axil/MSC/Med/Rad/Ulnar n sensation intact to light touch. Radial pulse 2+. LABS:  No results for input(s): WBC, HGB, HCT, PLT, INR, PTT, NA, K, BUN, CREATININE, GLUCOSE in the last 72 hours.     Invalid input(s): PT     A/P: 31 y.o. male with right carpal tunnel syndrome.    - OR for right carpal tunnel release  - NPO since midnight  - site marked  - abx on hold for OR  - consent in chart    David Mcleod DO  PGY-2 Orthopedic Surgery  7:04 AM 2/26/2021

## 2021-02-27 NOTE — OP NOTE
[address] [facility] [address]                                OPERATIVE REPORT    PATIENT NAME: Gloria Hernandez                     :        1990  MED REC NO:   [id number]                             ROOM:  ACCOUNT NO:   [de-identified]                           ADMIT DATE: 2021  PROVIDER:     Emili Cespedes DO    DATE OF PROCEDURE:  2021    PREOPERATIVE DIAGNOSIS:  Right carpal tunnel syndrome. POSTOPERATIVE DIAGNOSIS:  Right carpal tunnel syndrome. PROCEDURE PERFORMED:  Right carpal tunnel release. SURGEON:  Kacey Hernandez MD    ASSISTANTS:  Selene Jones. Mira Antonio; Noemy Rivera DO    ANESTHESIA TYPE:  Local with 9 mL of 0.5% bupivacaine and 1% lidocaine  without epinephrine. ESTIMATED BLOOD LOSS:  Minimal.    IV FLUIDS:  None. COMPLICATIONS:  None. SPECIMENS TYPE:  None. INDICATIONS FOR PROCEDURE:  This is a 25-year-old male who has had  symptoms of right carpal tunnel syndrome for about 3 years. He is a  right-handed . The symptoms do aggravate him while he works. Clinically, he has carpal tunnel syndrome in the right hand and we  discussed surgical and non-surgical options at that time and the patient  wished to undergo surgical intervention, which would consist of right  carpal tunnel release. DETAILED DESCRIPTION OF PROCEDURE:  The patient was met in the  preoperative area, where we obtained written consent for a right carpal  tunnel release. The right upper extremity was marked for the procedure  at this time. We discussed the risks, benefits, and alternatives to the  procedure which included, but were not limited to, bleeding, blood  clots, infection, wound complications, damage to surrounding structures,  need for further surgery, risk of any anesthesia, loss of limb, loss of  life.   The patient understood these risks and still wished to undergo  the indicated procedure. The patient was brought to the operating room, where he was placed in  the supine position on a 3080 table with a hand table extension. A  tourniquet was placed on the right upper forearm but not yet inflated. The patient was prepped and draped in a normal sterile fashion for the  procedure. We began the procedure by marking out the incision, which  was over the palmar aspect of the right wrist, not extending past  Lantigua's cardinal line and approximately past the wrist flexion crease. The incision marked was roughly 2-3 cm in length. Then, a time-out was  performed to identify the correct patient, procedure, and extremity. All staff members within the room and the patient agreed. We then began  the procedure by injecting roughly 9 mL of 0.5% bupivacaine and 1%  lidocaine without epinephrine. After injection, there was nice  blanching in the skin and a pinch test confirmed that the patient did  not feel anything over the region of the incision. At this time, we  then made our incision using a 15-blade through the skin and  subcutaneous tissue. This was about 2-3 cm in length. We then spread  the underlying palmar fat with the use of Holli retractors and identified  the palmar fascia. We then incised in-line with our skin incision  through the palmar fascia and made it down to the transverse carpal  fibers of the transverse carpal ligament. We also identified the  palmaris brevis at the proximal extent of the incision. We then brushed  this out of the way with [facility] retractors and the use of our 15-blade. After complete exposure of the transverse carpal fibers, we then used  the bouncing technique with an inside knife, 15-blade, to incise through  the transverse carpal ligament proximally and distally as much as we  could visually see.   We then used our Holli retractors to gain  visualization of the distal aspect of the transverse carpal ligament and  then we incised using a 15-blade up to the region of the sentinel fat. We palpated with the use of a Sydelle Decant and identified that there was  complete release distally. We then turned our attention proximally and used a tenotomy scissors to  bluntly dissect above and below the transverse carpal ligament. We then  took our tenotomy scissors and used a sliding technique proximally  towards the wrist and incised the transverse carpal ligament proximally  until there was complete release. We then rechecked this with the use  of a Ada. There were no identifiable cysts or lesions within the  transverse carpal tunnel that could be compressing. The transverse  carpal ligament was completely hypertrophied within that region  overlying the median nerve. There was complete release of the median  nerve visualized at this time and it showed a mild hour-glass deformity. We then irrigated the incision with copious amounts of normal saline and  then began our closure. We closed with 3-0 nylon suture in a simple  interrupted fashion. After closure, we placed sterile dressings  consisting of Adaptic, 4 x 4, fluffs, Kerlix, and Ace wrap. We then  held pressure for roughly 5 minutes in duration with the extremity  elevated, and then there was release of the tourniquet at this time. The tourniquet was up for roughly 13  minutes in duration. The patient  was then brought back to the hospital bed and brought back to the PACU  in stable condition. POSTOPERATIVE INSTRUCTIONS:  The patient will be able to use his right  upper extremity but should not carry anything heavier than fork for the  first two weeks. He can remove the dressings at 3-5 days postop if  there is no drainage and then convert to Band-Aids. He was instructed  to follow up in the office in 10-14 days for suture removal and wound  check. Dr. Horacio Bailey was actively participating and present through the entirety of  the procedure.           Liss Mckeon DO    D: 02/26/2021 8:37:34       T: 02/26/2021 11:33:44     [email or web address]  Job#: [id number]     Doc#: [id number]    CC:

## 2021-03-09 ENCOUNTER — OFFICE VISIT (OUTPATIENT)
Dept: ORTHOPEDIC SURGERY | Age: 31
End: 2021-03-09

## 2021-03-09 VITALS — HEIGHT: 72 IN | WEIGHT: 230 LBS | BODY MASS INDEX: 31.15 KG/M2

## 2021-03-09 DIAGNOSIS — G56.01 RIGHT CARPAL TUNNEL SYNDROME: Primary | ICD-10-CM

## 2021-03-09 PROCEDURE — 99024 POSTOP FOLLOW-UP VISIT: CPT | Performed by: ORTHOPAEDIC SURGERY

## 2021-03-09 NOTE — PROGRESS NOTES
Chief Complaint   Patient presents with    Post-Op Check     RT CTR 02/26/2021   This patient who underwent release of right carpal tunnel syndrome on 2/26/2021 is seen here in follow-up. All his preoperative symptoms are now resolved. The incision is well-healed. The sutures are being removed today and he will continue normal activities and we will see him as necessary.

## 2021-06-09 ENCOUNTER — OFFICE VISIT (OUTPATIENT)
Dept: ORTHOPEDIC SURGERY | Age: 31
End: 2021-06-09
Payer: COMMERCIAL

## 2021-06-09 VITALS — WEIGHT: 230 LBS | HEIGHT: 72 IN | BODY MASS INDEX: 31.15 KG/M2

## 2021-06-09 DIAGNOSIS — G89.29 CHRONIC PAIN OF RIGHT WRIST: ICD-10-CM

## 2021-06-09 DIAGNOSIS — G56.02 LEFT CARPAL TUNNEL SYNDROME: Primary | ICD-10-CM

## 2021-06-09 DIAGNOSIS — M25.531 CHRONIC PAIN OF RIGHT WRIST: ICD-10-CM

## 2021-06-09 PROCEDURE — 99212 OFFICE O/P EST SF 10 MIN: CPT | Performed by: ORTHOPAEDIC SURGERY

## 2021-06-09 PROCEDURE — 20526 THER INJECTION CARP TUNNEL: CPT | Performed by: ORTHOPAEDIC SURGERY

## 2021-06-09 RX ORDER — LIDOCAINE HYDROCHLORIDE 10 MG/ML
1 INJECTION, SOLUTION INFILTRATION; PERINEURAL ONCE
Status: COMPLETED | OUTPATIENT
Start: 2021-06-09 | End: 2021-06-09

## 2021-06-09 RX ORDER — METHYLPREDNISOLONE ACETATE 40 MG/ML
40 INJECTION, SUSPENSION INTRA-ARTICULAR; INTRALESIONAL; INTRAMUSCULAR; SOFT TISSUE ONCE
Status: COMPLETED | OUTPATIENT
Start: 2021-06-09 | End: 2021-06-09

## 2021-06-09 RX ORDER — METHYLPREDNISOLONE ACETATE 80 MG/ML
80 INJECTION, SUSPENSION INTRA-ARTICULAR; INTRALESIONAL; INTRAMUSCULAR; SOFT TISSUE ONCE
Status: SHIPPED | OUTPATIENT
Start: 2021-06-09

## 2021-06-09 RX ADMIN — METHYLPREDNISOLONE ACETATE 40 MG: 40 INJECTION, SUSPENSION INTRA-ARTICULAR; INTRALESIONAL; INTRAMUSCULAR; SOFT TISSUE at 14:31

## 2021-06-09 RX ADMIN — LIDOCAINE HYDROCHLORIDE 1 ML: 10 INJECTION, SOLUTION INFILTRATION; PERINEURAL at 13:28

## 2021-06-09 NOTE — PROGRESS NOTES
Chief Complaint   Patient presents with    Follow-up     left carpal tunnel syndrome/ discomfort - had RT CTR done 02/26/2021   This patient is seen here today complaining of pain and numbness and tingling in the left hand. The patient had a right carpal tunnel release on February 26. He says the symptoms in the left hand appear to be the similar to the one that he had before his carpal tunnel release on the other side. As far as other side is concerned he says he has had no problem with numbness or tingling any further but he does have some pain and tenderness at the incision site. As for the left hand he says he does get numbness and tingling in the thumb and the index finger. He shakes his hand and this disappears. Interestingly at night when he puts on the brace on the left hand his symptoms come back within about 5 minutes. When he is pressing on his wrist he has pain in the wrist joint. He has no problem driving. If he holds something then he has some feeling of weakness. The patient is not diabetic. Examination: Left elbow and forearm examination shows full painless range of motion. There was no tenderness over the ulnar nerve and percussion test was negative. His Phalen test was also negative. When checking for the Phalen test he complained mainly of the pain in the wrist on the volar aspect. There appears to be the same amount of mobility at the radial ulnar joints. X-rays: 3 views of the wrist showed no abnormality. Diagnosis: Possible carpal tunnel syndrome. Treatment: Under sterile condition I injected his carpal tunnel with 40 mg of Depo-Medrol and 1 cc of 1% plain lidocaine. I have explained to him that he will have numbness in the fingers for the couple of hours. He is instructed to put ice when he gets home. I will see him in 2 weeks time but should his symptoms be controlled then he will call and cancel the appointment.

## (undated) DEVICE — GLOVE ORANGE PI 8 1/2   MSG9085

## (undated) DEVICE — GLOVE ORANGE PI 8   MSG9080

## (undated) DEVICE — SVMMC HND

## (undated) DEVICE — GLOVE ORANGE PI 7 1/2   MSG9075

## (undated) DEVICE — 1000 S-DRAPE TOWEL DRAPE 10/BX: Brand: STERI-DRAPE™

## (undated) DEVICE — APPLICATOR MEDICATED 26 CC SOLUTION HI LT ORNG CHLORAPREP

## (undated) DEVICE — INTENDED FOR TISSUE SEPARATION, AND OTHER PROCEDURES THAT REQUIRE A SHARP SURGICAL BLADE TO PUNCTURE OR CUT.: Brand: BARD-PARKER ® CARBON RIB-BACK BLADES

## (undated) DEVICE — TOURNIQUET CUF BLD PRESSURE 4X18 IN 2 PRT SINGLE BLDR RED